# Patient Record
Sex: MALE | Race: WHITE | NOT HISPANIC OR LATINO | Employment: FULL TIME | URBAN - METROPOLITAN AREA
[De-identification: names, ages, dates, MRNs, and addresses within clinical notes are randomized per-mention and may not be internally consistent; named-entity substitution may affect disease eponyms.]

---

## 2024-12-23 RX ORDER — SODIUM PICOSULFATE, MAGNESIUM OXIDE, AND ANHYDROUS CITRIC ACID 12; 3.5; 1 G/175ML; G/175ML; MG/175ML
350 LIQUID ORAL TAKE AS DIRECTED
Qty: 350 ML | Refills: 0 | Status: SHIPPED | OUTPATIENT
Start: 2024-12-23

## 2025-01-10 ENCOUNTER — OUTSIDE FACILITY SERVICE (OUTPATIENT)
Dept: GASTROENTEROLOGY | Facility: CLINIC | Age: 50
End: 2025-01-10
Payer: COMMERCIAL

## 2025-01-10 DIAGNOSIS — K22.2 ESOPHAGEAL STENOSIS: ICD-10-CM

## 2025-01-10 DIAGNOSIS — K20.0 ESOPHAGITIS, EOSINOPHILIC: Primary | ICD-10-CM

## 2025-01-10 PROCEDURE — 45390 COLONOSCOPY W/RESECTION: CPT | Performed by: INTERNAL MEDICINE

## 2025-01-10 PROCEDURE — 43239 EGD BIOPSY SINGLE/MULTIPLE: CPT | Performed by: INTERNAL MEDICINE

## 2025-01-10 PROCEDURE — 45385 COLONOSCOPY W/LESION REMOVAL: CPT | Performed by: INTERNAL MEDICINE

## 2025-01-10 PROCEDURE — 43249 ESOPH EGD DILATION <30 MM: CPT | Performed by: INTERNAL MEDICINE

## 2025-01-10 PROCEDURE — 45388 COLONOSCOPY W/ABLATION: CPT | Performed by: INTERNAL MEDICINE

## 2025-01-10 PROCEDURE — 88305 TISSUE EXAM BY PATHOLOGIST: CPT | Performed by: INTERNAL MEDICINE

## 2025-01-10 RX ORDER — PANTOPRAZOLE SODIUM 40 MG/1
40 TABLET, DELAYED RELEASE ORAL DAILY
Qty: 90 TABLET | Refills: 3 | Status: SHIPPED | OUTPATIENT
Start: 2025-01-10

## 2025-01-13 ENCOUNTER — LAB REQUISITION (OUTPATIENT)
Dept: LAB | Facility: HOSPITAL | Age: 50
End: 2025-01-13
Payer: COMMERCIAL

## 2025-01-13 ENCOUNTER — TELEPHONE (OUTPATIENT)
Dept: GASTROENTEROLOGY | Facility: CLINIC | Age: 50
End: 2025-01-13
Payer: COMMERCIAL

## 2025-01-13 DIAGNOSIS — K22.2 ESOPHAGEAL OBSTRUCTION: ICD-10-CM

## 2025-01-13 DIAGNOSIS — K57.30 DIVERTICULOSIS OF LARGE INTESTINE WITHOUT PERFORATION OR ABSCESS WITHOUT BLEEDING: ICD-10-CM

## 2025-01-13 DIAGNOSIS — K31.89 OTHER DISEASES OF STOMACH AND DUODENUM: ICD-10-CM

## 2025-01-13 DIAGNOSIS — Z12.11 ENCOUNTER FOR SCREENING FOR MALIGNANT NEOPLASM OF COLON: ICD-10-CM

## 2025-01-13 DIAGNOSIS — D12.8 BENIGN NEOPLASM OF RECTUM: ICD-10-CM

## 2025-01-13 DIAGNOSIS — D12.0 BENIGN NEOPLASM OF CECUM: ICD-10-CM

## 2025-01-13 DIAGNOSIS — K64.8 OTHER HEMORRHOIDS: ICD-10-CM

## 2025-01-13 DIAGNOSIS — D12.4 BENIGN NEOPLASM OF DESCENDING COLON: ICD-10-CM

## 2025-01-13 DIAGNOSIS — R13.10 DYSPHAGIA, UNSPECIFIED: ICD-10-CM

## 2025-01-13 DIAGNOSIS — D12.5 BENIGN NEOPLASM OF SIGMOID COLON: ICD-10-CM

## 2025-01-13 DIAGNOSIS — D12.3 BENIGN NEOPLASM OF TRANSVERSE COLON: ICD-10-CM

## 2025-01-13 NOTE — TELEPHONE ENCOUNTER
Patients wife called billing to see if she could get an estimate of how much they may have to pay upfront for EGD on 1/24/25 and on anesthesia cost, billing told them to call our office.

## 2025-01-14 LAB — REF LAB TEST METHOD: NORMAL

## 2025-01-24 ENCOUNTER — OUTSIDE FACILITY SERVICE (OUTPATIENT)
Dept: GASTROENTEROLOGY | Facility: CLINIC | Age: 50
End: 2025-01-24
Payer: COMMERCIAL

## 2025-01-24 PROCEDURE — 43239 EGD BIOPSY SINGLE/MULTIPLE: CPT | Performed by: INTERNAL MEDICINE

## 2025-01-24 PROCEDURE — 88305 TISSUE EXAM BY PATHOLOGIST: CPT | Performed by: INTERNAL MEDICINE

## 2025-01-24 PROCEDURE — 43249 ESOPH EGD DILATION <30 MM: CPT | Performed by: INTERNAL MEDICINE

## 2025-01-27 ENCOUNTER — LAB REQUISITION (OUTPATIENT)
Dept: LAB | Facility: HOSPITAL | Age: 50
End: 2025-01-27
Payer: COMMERCIAL

## 2025-01-27 DIAGNOSIS — K22.89 OTHER SPECIFIED DISEASE OF ESOPHAGUS: ICD-10-CM

## 2025-01-27 DIAGNOSIS — K22.2 ESOPHAGEAL OBSTRUCTION: ICD-10-CM

## 2025-01-27 DIAGNOSIS — K31.89 OTHER DISEASES OF STOMACH AND DUODENUM: ICD-10-CM

## 2025-01-27 DIAGNOSIS — K20.0 EOSINOPHILIC ESOPHAGITIS: ICD-10-CM

## 2025-01-28 LAB
CYTO UR: NORMAL
LAB AP CASE REPORT: NORMAL
LAB AP CLINICAL INFORMATION: NORMAL
PATH REPORT.FINAL DX SPEC: NORMAL
PATH REPORT.GROSS SPEC: NORMAL

## 2025-01-31 ENCOUNTER — HOSPITAL ENCOUNTER (OUTPATIENT)
Dept: NUTRITION | Facility: HOSPITAL | Age: 50
Setting detail: RECURRING SERIES
Discharge: HOME | End: 2025-01-31

## 2025-01-31 PROCEDURE — 97802 MEDICAL NUTRITION INDIV IN: CPT

## 2025-01-31 NOTE — CONSULTS
[facility]          Initial 60 Minute Nutrition Visit    Date: 2025   Patient Name: Teodoro Akers  : 1975   MRN: [id number]   Referring Provider: Frank Campo MD    Reason for Visit: EoE  Visit Format: IP    Nutrition Assessment       Social History:   Social History     Socioeconomic History    Marital status:      Active Problem List: There are no active problems to display for this patient.     Current Medications:   Current Outpatient Medications:     pantoprazole (PROTONIX) 40 MG EC tablet, Take 1 tablet by mouth Daily., Disp: 90 tablet, Rfl: 3    Sod Picosulfate-Mag Ox-Cit Acd (Clenpiq) 10-3.5-12 MG-GM -GM/175ML solution, Take 350 mL by mouth Take As Directed for 1 dose., Disp: 350 mL, Rfl: 0    Labs: n/a    Hunger Vital Sign Food Insecurity Assessment:  Within the past 12 months I/we worried whether our food would run out before I/we got money to buy more: no   Within the past 12 months the food I/we bought just didn't last and I/we didn't have money to get more: no   Use of food assistance programs (WIC, food stamps, food [place]) no       Food & Nutrition Related History       Food Allergies: none identified at this time  Food Intolerances: n/a  Food Behavior: n/a  Nutrition Impact Symptoms: swallowing   Gastrointestinal conditions that impact intake or food choices: EoE  Details at home: n/a  Who prepares most meals: spouse and patient   Who does grocery shopping: spouse and patient   How many meals are purchased from fast food/sit down restaurants per week:  not often  Difficulty chewin - Normal  Difficulty swallowin - Moderate - Frequent choking necessitating changes in diet  Diet requirement related to personal preference or cultural belief:  Six Food elimination diet  History of eating disorder/disordered eating habits: None  Language/communication details: English  Barriers to learning: No barriers identified at this time    24  Hour Recall: See Assessment     Anthropometrics      Height:   Ht Readings from Last 1 Encounters:   No data found for Ht     Weight:   Wt Readings from Last 3 Encounters:   No data found for Wt     BMI: There is no height or weight on file to calculate BMI.   Weight Change: n/a     Physical Activity       Barriers to physical activity: none identified      Physical activity comments: not discussed     Estimated Needs     Estimated Energy Needs: n/a    Estimated Protein Needs: n/a     Estimated Fluid Needs: n/a     Discussion / Education      Patient is a 49 year old male referred for Eosinophilic Esophagitis (EoE). Patient states that he has had trouble swallowing. He states that EoE is suspected. He states that he also has high cholesterol and would like recommendations on how to lower his cholesterol levels. Education provided today primarily focused on the six food elimination diet for EoE. We discussed what the six food elimination diet is. Recommended eliminating dairy, wheat, egg, soy, nuts/tree nuts, and fish/shellfish for 4-6 weeks. After 4-6 weeks, discussed introducing one food allergen at a time to specifically identify which of the top allergens are causing complications. We discussed common food items and ingredients from each group. Discussed food items that are acceptable to eat and some alternatives for the eliminated foods. Discussed reading food labels and ingredient lists to identify allergens. Provided patient with sample recipes and meal plans that eliminate the top 6 food allergens. We discussed ways to lower his cholesterol levels. Discussed increasing his fiber intake to 30-35 grams per day. Provided patient with a list of foods that are high in fiber. Discussed the different types of fat and how they affect cholesterol levels. Recommended he decrease the consumption of saturated fat and increase unsaturated fat. Encouraged patient to reach out with any further questions or concerns.      Assessment of patient engagement: Engaged    Measurement of understanding: Patient verbalized understanding    Resources Provided: The Six Food Elimination Diet for EoE, EoE recipes and meal plan, Fat and cholesterol handout, High fiber snacks    Goal (s)      Goal 1: Eliminate top 6 food allergens for 4-6 weeks          Plan of Care     PES Statement:   Food causing negative symptoms related to EoE as evidenced by trouble swallowing and esophagus narrowing.     Follow Up Visit      Follow Up:   Will call to schedule     Total of 60 minutes spent with patient on nutrition counseling. Education based on Academy of Nutrition and Dietetics guidelines. Patient was provided with RD's contact information. Thank you for this referral.